# Patient Record
Sex: FEMALE
[De-identification: names, ages, dates, MRNs, and addresses within clinical notes are randomized per-mention and may not be internally consistent; named-entity substitution may affect disease eponyms.]

---
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
## 2024-02-16 ENCOUNTER — OFFICE VISIT (OUTPATIENT)
Facility: CLINIC | Age: 24
End: 2024-02-16
Payer: COMMERCIAL

## 2024-02-16 VITALS
HEART RATE: 80 BPM | SYSTOLIC BLOOD PRESSURE: 112 MMHG | HEIGHT: 64 IN | DIASTOLIC BLOOD PRESSURE: 72 MMHG | WEIGHT: 131 LBS | BODY MASS INDEX: 22.36 KG/M2 | OXYGEN SATURATION: 98 %

## 2024-02-16 DIAGNOSIS — Z00.00 ENCOUNTER FOR ROUTINE ADULT HEALTH EXAMINATION WITHOUT ABNORMAL FINDINGS: Primary | ICD-10-CM

## 2024-02-16 DIAGNOSIS — Z30.41 ENCOUNTER FOR SURVEILLANCE OF CONTRACEPTIVE PILLS: ICD-10-CM

## 2024-02-16 DIAGNOSIS — L70.9 ACNE, UNSPECIFIED ACNE TYPE: ICD-10-CM

## 2024-02-16 PROBLEM — F84.0 AUTISM SPECTRUM DISORDER (HCC): Status: ACTIVE | Noted: 2019-08-30

## 2024-02-16 PROBLEM — F84.0 AUTISM SPECTRUM DISORDER: Status: ACTIVE | Noted: 2019-08-30

## 2024-02-16 PROBLEM — F43.22 ADJUSTMENT DISORDER WITH ANXIOUS MOOD: Status: RESOLVED | Noted: 2023-06-22 | Resolved: 2024-02-16

## 2024-02-16 PROBLEM — F43.22 ADJUSTMENT DISORDER WITH ANXIOUS MOOD: Status: ACTIVE | Noted: 2023-06-22

## 2024-02-16 RX ORDER — LEVONORGESTREL AND ETHINYL ESTRADIOL 0.1-0.02MG
1 KIT ORAL DAILY
COMMUNITY
End: 2024-02-16

## 2024-02-16 RX ORDER — LEVONORGESTREL AND ETHINYL ESTRADIOL 0.1-0.02MG
1 KIT ORAL DAILY
Qty: 90 TABLET | Refills: 3 | Status: SHIPPED | OUTPATIENT
Start: 2024-02-16

## 2024-02-16 RX ORDER — TRETINOIN 0.5 MG/G
CREAM TOPICAL
COMMUNITY
Start: 2023-10-09

## 2024-02-16 NOTE — PROGRESS NOTES
HPI:   Yaa Dan is a 23 year old female who presents for a complete physical exam.     Has been on OCP's for acne for three years, which has helped. Also feels it has stabilized the mood well.     Last pap: Never had one before   Menses: Regular, monthly cycles   Contraception:  OCP's due to acne     History of STD's: None  Family hx of breast, ovarian, cervical or colon CA: None  Diet and exercise: Breakfast is waffles, raspberries, and juice with Miralax. Has been eating crackers and yogurt for lunch. Also has a side of dessert. Dinner is home cooked, and will have meat, fruit, buttered noodles, and cucumbers. Exercises at the gym on Sundays, and will do machines. Drinks a lot of water.    Immunizations:  Tdap: 2021, Flu: 11/2023, HPV: Completed, Covid: Completed    REVIEW OF SYSTEMS:   GENERAL: feels well otherwise   SKIN: acne  EYES: no vision problems  BREAST: no lumps or masses, no nipple discharge   LUNGS: denies shortness of breath  CARDIOVASCULAR: denies chest pain  GI: denies abdominal pain,  No constipation or diarrhea, no hematochezia  : denies dysuria, vaginal discharge or itching  NEURO: denies headaches  PSYCHE: denies depression or anxiety          Current Outpatient Medications   Medication Sig Dispense Refill    Tretinoin 0.05 % External Cream APPLY TO FACE EVERY OTHER DAY AT BEDTIME      Levonorgestrel-Ethinyl Estrad 0.1-20 MG-MCG Oral Tab Take 1 tablet by mouth daily. 90 tablet 3     No Known Allergies   History reviewed. No pertinent past medical history.   History reviewed. No pertinent surgical history.   History reviewed. No pertinent family history.   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 : No. Children: None.         EXAM:     Wt Readings from Last 6 Encounters:   02/16/24 131 lb (59.4 kg)     Body mass index is 22.49 kg/m².   /72 (BP Location: Right arm, Patient Position: Sitting, Cuff Size: adult)   Pulse 80   Ht 5' 4\" (1.626 m)   Wt 131 lb (59.4 kg)   LMP 01/20/2024 (Approximate)   SpO2 98%   BMI 22.49 kg/m²     GENERAL: well developed, well nourished, in no apparent distress   SKIN: no rashes, no suspicious lesions  HEENT: atraumatic, normocephalic, throat clear; normal dentition  EYES: PERRLA, EOMI, conjunctiva are clear  NECK: supple, no adenopathy or thyroid masses   BREAST: no dominant or suspicious mass, no nipple discharge  LUNGS: clear to auscultation  CARDIO: RRR without murmur  GI: good bowel sounds, no masses, HSM or tenderness  : deferred, not indicated   EXTREMITIES: no edema    No results found for: \"CHOLEST\", \"HDL\", \"LDL\", \"TRIGLY\"   ASSESSMENT AND PLAN:   Yaa Dan is a 23 year old female who presents for a complete physical exam.  Encounter Diagnoses   Name Primary?    Encounter for routine adult health examination without abnormal findings Yes    Acne, unspecified acne type     Encounter for surveillance of contraceptive pills      No orders of the defined types were placed in this encounter.    Will continue OCP's for better control of acne and hormonal mood swings. Refills sent.  Discussed in great detail risks of combined OCP's, including but not limited to DVT, PE, MI and stroke, and patient expressed understanding and acceptance of these risks.    Discussed with patient the following:  -Monthly breast self-exam  -Breast cancer screening/mammograms and clinical breast exams  -Cervical cancer screening/pap smears: deferred as never sexually active   -Adequate calcium and Vitamin D intake to prevent osteoporosis  -Healthy diet including adequate intake of vegetables and  fruits, appropriate portion sizes, minimizing highly concentrated carbohydrate foods  -Exercising 30 minutes a day most days of the week   -Diabetes screening   -Cholesterol screening   -Recommendation for yearly influenza vaccine  -Need for HPV vaccination series: completed   -Need for Tdap once as an adult and Td booster every 10 years  -STI screening (GC/Chlamydia/HIV): Not indicated   -Hepatitis C screening for all adults between the ages of 18 and 79: Not indicated     All questions were answered during the visit and the patient verbalizes understanding. She will return in one year for next WWE or sooner as needed.    Meds & Refills for this Visit:  Requested Prescriptions     Signed Prescriptions Disp Refills    Levonorgestrel-Ethinyl Estrad 0.1-20 MG-MCG Oral Tab 90 tablet 3     Sig: Take 1 tablet by mouth daily.       Imaging & Consults:  None    Fawn Leon DO  2/16/2024  12:10 PM

## 2025-02-21 ENCOUNTER — OFFICE VISIT (OUTPATIENT)
Facility: CLINIC | Age: 25
End: 2025-02-21
Payer: COMMERCIAL

## 2025-02-21 VITALS
WEIGHT: 136 LBS | OXYGEN SATURATION: 98 % | SYSTOLIC BLOOD PRESSURE: 110 MMHG | DIASTOLIC BLOOD PRESSURE: 68 MMHG | HEIGHT: 64 IN | HEART RATE: 84 BPM | BODY MASS INDEX: 23.22 KG/M2

## 2025-02-21 DIAGNOSIS — Z00.00 ENCOUNTER FOR ROUTINE ADULT HEALTH EXAMINATION WITHOUT ABNORMAL FINDINGS: Primary | ICD-10-CM

## 2025-02-21 DIAGNOSIS — Z30.41 ENCOUNTER FOR SURVEILLANCE OF CONTRACEPTIVE PILLS: ICD-10-CM

## 2025-02-21 PROCEDURE — 99395 PREV VISIT EST AGE 18-39: CPT | Performed by: FAMILY MEDICINE

## 2025-02-21 RX ORDER — LEVONORGESTREL/ETHIN.ESTRADIOL 0.1-0.02MG
1 TABLET ORAL DAILY
Qty: 90 TABLET | Refills: 3 | Status: SHIPPED | OUTPATIENT
Start: 2025-02-21

## 2025-02-21 NOTE — PROGRESS NOTES
HPI:   Yaa Dan is a 24 year old female who presents for a complete physical exam.     Developed a runny nose on Tuesday, and also has a sore throat. Not coughing or having a fever.   Has continued to take her birth control pills, but does not take the placebo week as she does not like to have a period. She does not have a problem taking the pill at the same time daily.    Last pap: Never had one before   Menses: None with continuous OCP's   Contraception:  OCP's    History of STD's: None  History of intimate partner violence: None  Family hx of breast, ovarian, cervical or colon CA: None  Diet and exercise: Breakfast is waffles, raspberries, and orange juice. Lunch is potato chips, yogurt, and chocolate. Sometimes has a quesadilla. Dinner is a meat, vegetable, and starch. Eats home cooked meals. Drinks a lot of water. Likes to go to the gym to exercise.   Immunizations:  Tdap: 2021, Flu: 11/2024, HPV: Completed, Covid: Completed    REVIEW OF SYSTEMS:   GENERAL: feels well otherwise   SKIN: denies any unusual skin lesions  EYES: no vision problems  BREAST: no lumps or masses, no nipple discharge   LUNGS: denies shortness of breath  CARDIOVASCULAR: denies chest pain  GI: denies abdominal pain,  No constipation or diarrhea, no hematochezia  : denies dysuria, vaginal discharge or itching  NEURO: denies headaches  PSYCHE: denies depression or anxiety          Current Outpatient Medications   Medication Sig Dispense Refill    Polyethylene Glycol 3350 (MIRALAX OR) As Directed.      Levonorgestrel-Ethinyl Estrad 0.1-20 MG-MCG Oral Tab Take 1 tablet by mouth daily. 90 tablet 3    Tretinoin 0.05 % External Cream APPLY TO FACE EVERY OTHER DAY AT BEDTIME       Allergies[1]   History reviewed. No pertinent past medical history.   History reviewed. No pertinent surgical history.   Family History   Problem Relation Age of Onset    Hypertension Mother     Dementia Maternal Grandmother       Social History:   Social  History     Socioeconomic History    Marital status: Single   Tobacco Use    Smoking status: Never     Passive exposure: Never    Smokeless tobacco: Never   Vaping Use    Vaping status: Never Used   Substance and Sexual Activity    Alcohol use: Never    Drug use: Never    Sexual activity: Never   Other Topics Concern    Caffeine Concern No    Exercise No    Seat Belt No    Special Diet No    Stress Concern No    Weight Concern No     Social Drivers of Health      [facility]    Housing Stability     Occ: Getting 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 : No. Children: None.         EXAM:     Wt Readings from Last 6 Encounters:   02/21/25 136 lb (61.7 kg)   02/16/24 131 lb (59.4 kg)     Body mass index is 23.34 kg/m².   /68   Pulse 84   Ht 5' 4\" (1.626 m)   Wt 136 lb (61.7 kg)   LMP 02/07/2025   SpO2 98%   BMI 23.34 kg/m²     GENERAL: well developed, well nourished, in no apparent distress   SKIN: no rashes, no suspicious lesions  HEENT: atraumatic, normocephalic, throat clear; normal dentition  EYES: PERRLA, EOMI, conjunctiva are clear  NECK: supple, no adenopathy or thyroid masses   BREAST: deferred  LUNGS: clear to auscultation  CARDIO: RRR without murmur  GI: good bowel sounds, no masses, HSM or tenderness  : deferred, not indicated   EXTREMITIES: no edema    No results found for: \"CHOLEST\", \"HDL\", \"LDL\", \"TRIGLY\"   ASSESSMENT AND PLAN:   Yaa Dan is a 24 year old female who presents for a complete physical exam.  Encounter Diagnoses   Name Primary?    Encounter for routine adult health examination without abnormal findings Yes    Encounter for surveillance of contraceptive pills      Refills of OCP's sent, and continue taking continuously with skipping placebo pill to avoid having a menstrual cycle per patient preference.   Discussed in great detail risks of combined OCP's, including but not  limited to DVT, PE, MI and stroke, and patient expressed understanding and acceptance of these risks.    Discussed with patient the following:  -Monthly breast self-exam  -Breast cancer screening/mammograms and clinical breast exams  -Cervical cancer screening/pap smears: not indicated as she has never been sexually active   -Adequate calcium and Vitamin D intake to prevent osteoporosis  -Healthy diet including adequate intake of vegetables and fruits, appropriate portion sizes, minimizing highly concentrated carbohydrate foods  -Exercising 30 minutes a day most days of the week   -Diabetes screening: not indicated  -Cholesterol screening: not indicated   -Recommendation for yearly influenza vaccine  -Need for HPV vaccination series: completed   -Need for Tdap once as an adult and Td booster every 10 years  -Contraception: OCP's  -STI screening (GC/Chlamydia/HIV): Not indicated  -Hepatitis C screening for all adults between the ages of 18 and 79: Check in the future     All questions were answered during the visit and the patient verbalizes understanding. She will return in one year for next WWE or sooner as needed.    Meds & Refills for this Visit:  Requested Prescriptions     Signed Prescriptions Disp Refills    Levonorgestrel-Ethinyl Estrad 0.1-20 MG-MCG Oral Tab 90 tablet 3     Sig: Take 1 tablet by mouth daily.       Imaging & Consults:  None    Fawn Leon DO  2/21/2025  12:15 PM         [1] No Known Allergies     Dupixent Pregnancy And Lactation Text: This medication likely crosses the placenta but the risk for the fetus is uncertain. This medication is excreted in breast milk.

## 2025-03-12 RX ORDER — LEVONORGESTREL AND ETHINYL ESTRADIOL 0.1-0.02MG
1 KIT ORAL DAILY
Qty: 84 TABLET | Refills: 3 | OUTPATIENT
Start: 2025-03-12

## 2025-07-11 ENCOUNTER — PATIENT MESSAGE (OUTPATIENT)
Facility: CLINIC | Age: 25
End: 2025-07-11

## 2025-07-11 NOTE — TELEPHONE ENCOUNTER
Dr. Leon, please see  mychart message from mom (HIPAA verified) and advise. Thanks.    Please respond directly to the patient if no additional staff support is required.     No future appointments.

## 2025-07-14 NOTE — TELEPHONE ENCOUNTER
I have been writing her pills so she is able to take them continuously so the pharmacy should be giving her enough to take it this way. Please call pharmacy to clarify and see how the request can be completed.

## 2025-07-15 ENCOUNTER — PATIENT MESSAGE (OUTPATIENT)
Facility: CLINIC | Age: 25
End: 2025-07-15

## 2025-07-15 RX ORDER — LEVONORGESTREL/ETHIN.ESTRADIOL 0.1-0.02MG
1 TABLET ORAL DAILY
Qty: 90 TABLET | Refills: 3 | Status: SHIPPED | OUTPATIENT
Start: 2025-07-15